# Patient Record
Sex: FEMALE | URBAN - METROPOLITAN AREA
[De-identification: names, ages, dates, MRNs, and addresses within clinical notes are randomized per-mention and may not be internally consistent; named-entity substitution may affect disease eponyms.]

---

## 2020-02-12 ENCOUNTER — TELEPHONE (OUTPATIENT)
Dept: HEMATOLOGY/ONCOLOGY | Facility: CLINIC | Age: 80
End: 2020-02-12

## 2020-02-12 NOTE — TELEPHONE ENCOUNTER
Patients daughter states that patient feels weak, fatigued. Would like to move lab appointment from tomorrow to today to monitor the patient.

## 2020-02-12 NOTE — TELEPHONE ENCOUNTER
"----- Message from Liberty Alberto sent at 2/12/2020  8:41 AM CST -----  Contact: Socorro   Name of caller: Socorro   Provider name: Taj Ortega MD   Contact Preference: [phone]  Is this regarding current patient or new patient?: current   What is the nature of the call?     - asking if pt can have lab today opposed to tomorrow as scheduled   Please call and speak with his daughter Socorro and advise.     Additional Notes:   "Thank you for all that you do for our patients'"     "